# Patient Record
Sex: MALE | Race: WHITE
[De-identification: names, ages, dates, MRNs, and addresses within clinical notes are randomized per-mention and may not be internally consistent; named-entity substitution may affect disease eponyms.]

---

## 2018-05-19 ENCOUNTER — HOSPITAL ENCOUNTER (EMERGENCY)
Dept: HOSPITAL 38 - CC.ED | Age: 63
Discharge: HOME | End: 2018-05-19
Payer: COMMERCIAL
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
## 2018-05-19 DIAGNOSIS — M25.561: ICD-10-CM

## 2018-05-19 DIAGNOSIS — Z88.1: ICD-10-CM

## 2018-05-19 DIAGNOSIS — F10.10: Primary | ICD-10-CM
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
## 2021-06-22 ENCOUNTER — HOSPITAL ENCOUNTER (EMERGENCY)
Dept: HOSPITAL 41 - JD.ED | Age: 66
Discharge: NURSING, REHAB OR LONG-TERM CARE | End: 2021-06-22
Payer: SELF-PAY

## 2021-06-22 DIAGNOSIS — Z20.822: ICD-10-CM

## 2021-06-22 DIAGNOSIS — Z88.0: ICD-10-CM

## 2021-06-22 DIAGNOSIS — I63.9: Primary | ICD-10-CM

## 2021-06-22 DIAGNOSIS — N28.9: ICD-10-CM

## 2021-06-22 DIAGNOSIS — Z72.0: ICD-10-CM

## 2021-06-22 PROCEDURE — 85025 COMPLETE CBC W/AUTO DIFF WBC: CPT

## 2021-06-22 PROCEDURE — 80053 COMPREHEN METABOLIC PANEL: CPT

## 2021-06-22 PROCEDURE — 82947 ASSAY GLUCOSE BLOOD QUANT: CPT

## 2021-06-22 PROCEDURE — U0002 COVID-19 LAB TEST NON-CDC: HCPCS

## 2021-06-22 PROCEDURE — 70450 CT HEAD/BRAIN W/O DYE: CPT

## 2021-06-22 PROCEDURE — 85610 PROTHROMBIN TIME: CPT

## 2021-06-22 PROCEDURE — 87635 SARS-COV-2 COVID-19 AMP PRB: CPT

## 2021-06-22 PROCEDURE — 99285 EMERGENCY DEPT VISIT HI MDM: CPT

## 2021-06-22 PROCEDURE — 85730 THROMBOPLASTIN TIME PARTIAL: CPT

## 2021-06-22 PROCEDURE — 80307 DRUG TEST PRSMV CHEM ANLYZR: CPT

## 2021-06-22 PROCEDURE — 36415 COLL VENOUS BLD VENIPUNCTURE: CPT

## 2021-06-22 PROCEDURE — 93005 ELECTROCARDIOGRAM TRACING: CPT
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
## 2021-06-23 NOTE — CT
Head CT

 

Technique: Multiple axial sections of the brain were obtained.  

Intravenous contrast was not utilized.  Reconstructed coronal and 

sagittal images were obtained.

 

Comparison: No prior intracranial imaging is available.

 

Findings: Ventricles along with basal cisterns and sulci over the 

convexities are mildly prominent.  Scattered areas of diminished 

density are noted within the periventricular and subcortical white 

matter compatible with small vessel ischemic demyelination change.  

Patchy areas of diminished density are seen within the basal ganglia 

compatible with old lacunar infarcts.  Small old lacunar infarct is 

also noted within the white matter of the left cerebellar hemisphere. 

 Old small infarct is also noted within the cortex of the right 

parietal region.

 

No evidence of intracranial hemorrhage is seen.  No midline shift or 

mass-effect is seen.

 

Bone window settings were obtained which show diffuse opacification of

 the maxillary sinuses as well as of the frontal and portions of the 

ethmoid sinuses.  Visualized mastoid sinuses show nothing acute.  No 

acute calvarial abnormality is appreciated.

 

Impression:

1.  Diffuse senescent change as noted above.  Scattered areas of old 

infarcts are noted.

2.  No acute intracranial abnormality is appreciated.

3.  Diffuse sinus disease which appears to be chronic.

 

If patient remains symptomatic, recommend MRI to further evaluate.

 

Diagnostic code #3

 

I agree with preliminary report from [facility] finalized on 06/22/21, 7:54 

PM CDT, is code 1